# Patient Record
Sex: FEMALE | Race: OTHER
[De-identification: names, ages, dates, MRNs, and addresses within clinical notes are randomized per-mention and may not be internally consistent; named-entity substitution may affect disease eponyms.]

---

## 2018-03-02 ENCOUNTER — HOSPITAL ENCOUNTER (INPATIENT)
Dept: HOSPITAL 8 - LDOP | Age: 28
LOS: 1 days | Discharge: HOME | End: 2018-03-03
Attending: OBSTETRICS & GYNECOLOGY | Admitting: OBSTETRICS & GYNECOLOGY
Payer: COMMERCIAL

## 2018-03-02 VITALS — SYSTOLIC BLOOD PRESSURE: 121 MMHG | DIASTOLIC BLOOD PRESSURE: 72 MMHG

## 2018-03-02 VITALS — DIASTOLIC BLOOD PRESSURE: 80 MMHG | SYSTOLIC BLOOD PRESSURE: 121 MMHG

## 2018-03-02 VITALS — HEIGHT: 60 IN | WEIGHT: 151.24 LBS | BODY MASS INDEX: 29.69 KG/M2

## 2018-03-02 VITALS — SYSTOLIC BLOOD PRESSURE: 130 MMHG | DIASTOLIC BLOOD PRESSURE: 93 MMHG

## 2018-03-02 VITALS — SYSTOLIC BLOOD PRESSURE: 118 MMHG | DIASTOLIC BLOOD PRESSURE: 84 MMHG

## 2018-03-02 DIAGNOSIS — Z3A.40: ICD-10-CM

## 2018-03-02 LAB
BASOPHILS # BLD AUTO: 0.05 X10^3/UL (ref 0–0.1)
BASOPHILS # BLD AUTO: 0.09 X10^3/UL (ref 0–0.1)
BASOPHILS NFR BLD AUTO: 0 % (ref 0–1)
BASOPHILS NFR BLD AUTO: 1 % (ref 0–1)
EOSINOPHIL # BLD AUTO: 0.01 X10^3/UL (ref 0–0.4)
EOSINOPHIL # BLD AUTO: 0.05 X10^3/UL (ref 0–0.4)
EOSINOPHIL NFR BLD AUTO: 0 % (ref 1–7)
EOSINOPHIL NFR BLD AUTO: 0 % (ref 1–7)
ERYTHROCYTE [DISTWIDTH] IN BLOOD BY AUTOMATED COUNT: 13.6 % (ref 9.6–15.2)
ERYTHROCYTE [DISTWIDTH] IN BLOOD BY AUTOMATED COUNT: 13.7 % (ref 9.6–15.2)
LYMPHOCYTES # BLD AUTO: 1.82 X10^3/UL (ref 1–3.4)
LYMPHOCYTES # BLD AUTO: 3.01 X10^3/UL (ref 1–3.4)
LYMPHOCYTES NFR BLD AUTO: 11 % (ref 22–44)
LYMPHOCYTES NFR BLD AUTO: 25 % (ref 22–44)
MCH RBC QN AUTO: 30 PG (ref 27–34.8)
MCH RBC QN AUTO: 30 PG (ref 27–34.8)
MCHC RBC AUTO-ENTMCNC: 33.8 G/DL (ref 32.4–35.8)
MCHC RBC AUTO-ENTMCNC: 34.1 G/DL (ref 32.4–35.8)
MCV RBC AUTO: 87.8 FL (ref 80–100)
MCV RBC AUTO: 88.9 FL (ref 80–100)
MD: NO
MD: NO
MONOCYTES # BLD AUTO: 0.89 X10^3/UL (ref 0.2–0.8)
MONOCYTES # BLD AUTO: 0.97 X10^3/UL (ref 0.2–0.8)
MONOCYTES NFR BLD AUTO: 6 % (ref 2–9)
MONOCYTES NFR BLD AUTO: 8 % (ref 2–9)
NEUTROPHILS # BLD AUTO: 13.41 X10^3/UL (ref 1.8–6.8)
NEUTROPHILS # BLD AUTO: 7.86 X10^3/UL (ref 1.8–6.8)
NEUTROPHILS NFR BLD AUTO: 66 % (ref 42–75)
NEUTROPHILS NFR BLD AUTO: 82 % (ref 42–75)
PLATELET # BLD AUTO: 201 X10^3/UL (ref 130–400)
PLATELET # BLD AUTO: 208 X10^3/UL (ref 130–400)
PMV BLD AUTO: 10 FL (ref 7.4–10.4)
PMV BLD AUTO: 10 FL (ref 7.4–10.4)
RBC # BLD AUTO: 4.42 X10^6/UL (ref 3.82–5.3)
RBC # BLD AUTO: 4.74 X10^6/UL (ref 3.82–5.3)

## 2018-03-02 PROCEDURE — 86900 BLOOD TYPING SEROLOGIC ABO: CPT

## 2018-03-02 PROCEDURE — 36415 COLL VENOUS BLD VENIPUNCTURE: CPT

## 2018-03-02 PROCEDURE — 82803 BLOOD GASES ANY COMBINATION: CPT

## 2018-03-02 PROCEDURE — 86922 COMPATIBILITY TEST ANTIGLOB: CPT

## 2018-03-02 PROCEDURE — 30233S1 TRANSFUSION OF NONAUTOLOGOUS GLOBULIN INTO PERIPHERAL VEIN, PERCUTANEOUS APPROACH: ICD-10-PCS | Performed by: OBSTETRICS & GYNECOLOGY

## 2018-03-02 PROCEDURE — 86923 COMPATIBILITY TEST ELECTRIC: CPT

## 2018-03-02 PROCEDURE — 00HU33Z INSERTION OF INFUSION DEVICE INTO SPINAL CANAL, PERCUTANEOUS APPROACH: ICD-10-PCS | Performed by: OBSTETRICS & GYNECOLOGY

## 2018-03-02 PROCEDURE — 85025 COMPLETE CBC W/AUTO DIFF WBC: CPT

## 2018-03-02 PROCEDURE — 86850 RBC ANTIBODY SCREEN: CPT

## 2018-03-02 PROCEDURE — 3E0R3BZ INTRODUCTION OF ANESTHETIC AGENT INTO SPINAL CANAL, PERCUTANEOUS APPROACH: ICD-10-PCS | Performed by: OBSTETRICS & GYNECOLOGY

## 2018-03-02 PROCEDURE — 85461 HEMOGLOBIN FETAL: CPT

## 2018-03-02 PROCEDURE — 86870 RBC ANTIBODY IDENTIFICATION: CPT

## 2018-03-02 RX ADMIN — SODIUM CHLORIDE, SODIUM LACTATE, POTASSIUM CHLORIDE, AND CALCIUM CHLORIDE SCH MLS/HR: .6; .31; .03; .02 INJECTION, SOLUTION INTRAVENOUS at 09:00

## 2018-03-02 RX ADMIN — PRENATAL VIT W/ FE FUMARATE-FA TAB 27-0.8 MG SCH EACH: 27-0.8 TAB at 09:00

## 2018-03-02 RX ADMIN — Medication SCH MLS/HR: at 18:21

## 2018-03-02 RX ADMIN — IBUPROFEN PRN MG: 600 TABLET ORAL at 20:03

## 2018-03-02 RX ADMIN — SODIUM CHLORIDE, SODIUM LACTATE, POTASSIUM CHLORIDE, AND CALCIUM CHLORIDE SCH MLS/HR: .6; .31; .03; .02 INJECTION, SOLUTION INTRAVENOUS at 17:00

## 2018-03-02 RX ADMIN — IBUPROFEN PRN MG: 600 TABLET ORAL at 12:21

## 2018-03-02 RX ADMIN — Medication SCH MLS/HR: at 08:21

## 2018-03-02 RX ADMIN — DOCUSATE SODIUM PRN MG: 100 CAPSULE, LIQUID FILLED ORAL at 20:03

## 2018-03-03 VITALS — SYSTOLIC BLOOD PRESSURE: 106 MMHG | DIASTOLIC BLOOD PRESSURE: 67 MMHG

## 2018-03-03 VITALS — SYSTOLIC BLOOD PRESSURE: 117 MMHG | DIASTOLIC BLOOD PRESSURE: 84 MMHG

## 2018-03-03 RX ADMIN — Medication SCH MLS/HR: at 05:50

## 2018-03-03 RX ADMIN — SODIUM CHLORIDE, SODIUM LACTATE, POTASSIUM CHLORIDE, AND CALCIUM CHLORIDE SCH MLS/HR: .6; .31; .03; .02 INJECTION, SOLUTION INTRAVENOUS at 01:00

## 2018-03-03 RX ADMIN — IBUPROFEN PRN MG: 600 TABLET ORAL at 02:20

## 2018-03-03 RX ADMIN — DOCUSATE SODIUM PRN MG: 100 CAPSULE, LIQUID FILLED ORAL at 07:54

## 2018-03-03 RX ADMIN — PRENATAL VIT W/ FE FUMARATE-FA TAB 27-0.8 MG SCH EACH: 27-0.8 TAB at 07:54

## 2018-03-03 RX ADMIN — SODIUM CHLORIDE, SODIUM LACTATE, POTASSIUM CHLORIDE, AND CALCIUM CHLORIDE SCH MLS/HR: .6; .31; .03; .02 INJECTION, SOLUTION INTRAVENOUS at 02:16

## 2018-03-03 RX ADMIN — Medication SCH MLS/HR: at 04:21

## 2018-03-03 RX ADMIN — IBUPROFEN PRN MG: 600 TABLET ORAL at 07:54

## 2019-02-28 ENCOUNTER — HOSPITAL ENCOUNTER (OUTPATIENT)
Dept: LAB | Facility: MEDICAL CENTER | Age: 29
End: 2019-02-28
Attending: PHYSICIAN ASSISTANT
Payer: COMMERCIAL

## 2019-02-28 ENCOUNTER — HOSPITAL ENCOUNTER (OUTPATIENT)
Facility: MEDICAL CENTER | Age: 29
End: 2019-02-28
Attending: PHYSICIAN ASSISTANT
Payer: COMMERCIAL

## 2019-02-28 PROCEDURE — 87624 HPV HI-RISK TYP POOLED RSLT: CPT

## 2019-02-28 PROCEDURE — 88175 CYTOPATH C/V AUTO FLUID REDO: CPT

## 2019-03-01 LAB
CYTOLOGY REG CYTOL: NORMAL
HPV HR 12 DNA CVX QL NAA+PROBE: NEGATIVE
HPV16 DNA SPEC QL NAA+PROBE: NEGATIVE
HPV18 DNA SPEC QL NAA+PROBE: NEGATIVE
SPECIMEN SOURCE: NORMAL

## 2019-07-11 ENCOUNTER — OFFICE VISIT (OUTPATIENT)
Dept: URGENT CARE | Facility: PHYSICIAN GROUP | Age: 29
End: 2019-07-11
Payer: COMMERCIAL

## 2019-07-11 VITALS
HEIGHT: 60 IN | WEIGHT: 150 LBS | OXYGEN SATURATION: 94 % | SYSTOLIC BLOOD PRESSURE: 108 MMHG | HEART RATE: 80 BPM | BODY MASS INDEX: 29.45 KG/M2 | DIASTOLIC BLOOD PRESSURE: 72 MMHG | TEMPERATURE: 98 F

## 2019-07-11 DIAGNOSIS — J40 BRONCHITIS: ICD-10-CM

## 2019-07-11 DIAGNOSIS — R05.9 COUGH: ICD-10-CM

## 2019-07-11 PROCEDURE — 99203 OFFICE O/P NEW LOW 30 MIN: CPT | Performed by: PHYSICIAN ASSISTANT

## 2019-07-11 RX ORDER — NORGESTIMATE AND ETHINYL ESTRADIOL 7DAYSX3 28
KIT ORAL
Refills: 4 | COMMUNITY
Start: 2019-05-11

## 2019-07-11 RX ORDER — ALBUTEROL SULFATE 90 UG/1
2 AEROSOL, METERED RESPIRATORY (INHALATION) EVERY 6 HOURS PRN
Qty: 8.5 G | Refills: 0 | Status: SHIPPED | OUTPATIENT
Start: 2019-07-11

## 2019-07-11 RX ORDER — METHYLPREDNISOLONE 4 MG/1
TABLET ORAL
Qty: 1 KIT | Refills: 0 | Status: SHIPPED | OUTPATIENT
Start: 2019-07-11

## 2019-07-11 ASSESSMENT — ENCOUNTER SYMPTOMS
VOMITING: 0
COUGH: 1
WHEEZING: 1
DIZZINESS: 1
EYE REDNESS: 0
DIARRHEA: 0
HEADACHES: 0
TINGLING: 0
SPUTUM PRODUCTION: 0
FEVER: 0
SHORTNESS OF BREATH: 0
CHILLS: 0
EYE DISCHARGE: 0
MYALGIAS: 0

## 2019-07-11 NOTE — PROGRESS NOTES
Subjective:      Alivia Ramirez is a 29 y.o. female who presents with Cough (cough along with mucus x3 weeks pt also states she feels dizzy out of nowhere )            Cough   This is a new problem. Episode onset: 3 weeks ago. The problem has been waxing and waning. The cough is non-productive. Associated symptoms include nasal congestion, postnasal drip and wheezing. Pertinent negatives include no chest pain, chills, ear pain, eye redness, fever, headaches, myalgias, rash or shortness of breath. Exacerbated by: night time, talking.  She has tried nothing for the symptoms. There is no history of asthma.   LNMP- 1 week ago.       Review of Systems   Constitutional: Positive for malaise/fatigue. Negative for chills and fever.   HENT: Positive for congestion and postnasal drip. Negative for ear discharge and ear pain.         Throat hoarseness.   Eyes: Negative for discharge and redness.   Respiratory: Positive for cough and wheezing. Negative for sputum production and shortness of breath.    Cardiovascular: Negative for chest pain.   Gastrointestinal: Negative for diarrhea and vomiting.   Genitourinary: Negative for dysuria.   Musculoskeletal: Negative for myalgias.   Skin: Negative for itching and rash.   Neurological: Positive for dizziness. Negative for tingling and headaches.        Reports intermittent light headedness if she doesn't eat.             Objective:     /72 (BP Location: Left arm, Patient Position: Sitting, BP Cuff Size: Adult)   Pulse 80   Temp 36.7 °C (98 °F) (Temporal)   Ht 1.524 m (5')   Wt 68 kg (150 lb)   SpO2 94%   BMI 29.29 kg/m²    PMH:  has no past medical history on file.  MEDS:   Current Outpatient Prescriptions:   •  TRI-SPRINTEC 0.18/0.215/0.25 MG-35 MCG Tab, , Disp: , Rfl: 4  •  methylPREDNISolone (MEDROL DOSEPAK) 4 MG Tablet Therapy Pack, UAD, Disp: 1 Kit, Rfl: 0  •  albuterol (PROAIR HFA) 108 (90 Base) MCG/ACT Aero Soln inhalation aerosol, Inhale 2 Puffs by mouth every 6  hours as needed for Shortness of Breath., Disp: 8.5 g, Rfl: 0  ALLERGIES: No Known Allergies  SURGHX: No past surgical history on file.  SOCHX:  reports that she has never smoked. She has never used smokeless tobacco.  FH: Family history was reviewed, no pertinent findings to report    Physical Exam   Constitutional: She is oriented to person, place, and time. She appears well-developed and well-nourished.   HENT:   Head: Normocephalic and atraumatic.   Mouth/Throat: No oropharyngeal exudate.   Ears- Canals clear- TM- with clear fluid effusions bilaterally.   Pos. PND, with slight erythema- without tonsillar edema or exudate.   Mild discharge noted bilaterally- to nares.      Eyes: Pupils are equal, round, and reactive to light. EOM are normal.   Neck: Normal range of motion. Neck supple.   Cardiovascular: Normal rate and regular rhythm.    No murmur heard.  Pulmonary/Chest: Effort normal. No respiratory distress. She has wheezes.   Harsh bronchial cough throughout the duration of the visit today.    Musculoskeletal: Normal range of motion. She exhibits no tenderness.   Lymphadenopathy:     She has no cervical adenopathy.   Neurological: She is alert and oriented to person, place, and time.   Skin: Skin is warm. No rash noted.   Psychiatric: She has a normal mood and affect. Her behavior is normal.   Vitals reviewed.              Assessment/Plan:     1. Bronchitis  - methylPREDNISolone (MEDROL DOSEPAK) 4 MG Tablet Therapy Pack; UAD  Dispense: 1 Kit; Refill: 0  - albuterol (PROAIR HFA) 108 (90 Base) MCG/ACT Aero Soln inhalation aerosol; Inhale 2 Puffs by mouth every 6 hours as needed for Shortness of Breath.  Dispense: 8.5 g; Refill: 0    2. Cough    It was explained to the pt. Today that due to the viral nature of the pt's illness, we will treat symptomatically today. Encouraged OTC supportive meds PRN. Encouraged small snacks throughout the day.   Humidification, increase fluids, avoid night time dairy.    Patient given precautionary s/sx that mandate immediate follow up and evaluation in the ED. Advised of risks of not doing so.    DDX, Supportive care, and indications for immediate follow-up discussed with patient.    Instructed to return to clinic or nearest emergency department if we are not available for any change in condition, further concerns, or worsening of symptoms.    The patient demonstrated a good understanding and agreed with the treatment plan.

## 2020-02-11 ENCOUNTER — HOSPITAL ENCOUNTER (OUTPATIENT)
Dept: HOSPITAL 8 - LDOP | Age: 30
Discharge: HOME | End: 2020-02-11
Attending: OBSTETRICS & GYNECOLOGY
Payer: COMMERCIAL

## 2020-02-11 DIAGNOSIS — Z3A.26: ICD-10-CM

## 2020-02-11 DIAGNOSIS — Z34.92: Primary | ICD-10-CM

## 2020-02-11 LAB — MICROSCOPIC: (no result)

## 2020-02-11 PROCEDURE — 76815 OB US LIMITED FETUS(S): CPT

## 2020-02-11 PROCEDURE — 87086 URINE CULTURE/COLONY COUNT: CPT

## 2020-02-11 PROCEDURE — 81001 URINALYSIS AUTO W/SCOPE: CPT

## 2020-02-11 PROCEDURE — G0463 HOSPITAL OUTPT CLINIC VISIT: HCPCS

## 2020-02-11 PROCEDURE — 85460 HEMOGLOBIN FETAL: CPT

## 2020-02-11 PROCEDURE — 99211 OFF/OP EST MAY X REQ PHY/QHP: CPT

## 2020-02-11 PROCEDURE — G0378 HOSPITAL OBSERVATION PER HR: HCPCS

## 2020-02-11 PROCEDURE — 36415 COLL VENOUS BLD VENIPUNCTURE: CPT

## 2020-04-25 ENCOUNTER — HOSPITAL ENCOUNTER (INPATIENT)
Dept: HOSPITAL 8 - LDOP | Age: 30
LOS: 2 days | Discharge: HOME | End: 2020-04-27
Attending: OBSTETRICS & GYNECOLOGY | Admitting: OBSTETRICS & GYNECOLOGY
Payer: COMMERCIAL

## 2020-04-25 VITALS — WEIGHT: 154.32 LBS | BODY MASS INDEX: 25.71 KG/M2 | HEIGHT: 65 IN

## 2020-04-25 DIAGNOSIS — Z80.3: ICD-10-CM

## 2020-04-25 DIAGNOSIS — Z3A.36: ICD-10-CM

## 2020-04-25 LAB
ALBUMIN SERPL-MCNC: 2.6 G/DL (ref 3.4–5)
ALP SERPL-CCNC: 223 U/L (ref 45–117)
ALT SERPL-CCNC: 126 U/L (ref 12–78)
ANION GAP SERPL CALC-SCNC: 10 MMOL/L (ref 5–15)
BASOPHILS # BLD AUTO: 0.01 X10^3/UL (ref 0–0.1)
BASOPHILS # BLD AUTO: 0.04 X10^3/UL (ref 0–0.1)
BASOPHILS NFR BLD AUTO: 0 % (ref 0–1)
BASOPHILS NFR BLD AUTO: 0 % (ref 0–1)
BILIRUB SERPL-MCNC: 0.5 MG/DL (ref 0.2–1)
CALCIUM SERPL-MCNC: 8.8 MG/DL (ref 8.5–10.1)
CHLORIDE SERPL-SCNC: 111 MMOL/L (ref 98–107)
CREAT SERPL-MCNC: 0.95 MG/DL (ref 0.55–1.02)
EOSINOPHIL # BLD AUTO: 0.02 X10^3/UL (ref 0–0.4)
EOSINOPHIL # BLD AUTO: 0.06 X10^3/UL (ref 0–0.4)
EOSINOPHIL NFR BLD AUTO: 0 % (ref 1–7)
EOSINOPHIL NFR BLD AUTO: 1 % (ref 1–7)
ERYTHROCYTE [DISTWIDTH] IN BLOOD BY AUTOMATED COUNT: 13.9 % (ref 9.6–15.2)
ERYTHROCYTE [DISTWIDTH] IN BLOOD BY AUTOMATED COUNT: 14.1 % (ref 9.6–15.2)
HBV CORE IGM SERPL QL IA: 21 MG/DL (ref 0–12)
HBV SURFACE AB SER RIA-ACNC: 82.2 MG/DL
LYMPHOCYTES # BLD AUTO: 1.54 X10^3/UL (ref 1–3.4)
LYMPHOCYTES # BLD AUTO: 2.15 X10^3/UL (ref 1–3.4)
LYMPHOCYTES NFR BLD AUTO: 11 % (ref 22–44)
LYMPHOCYTES NFR BLD AUTO: 16 % (ref 22–44)
MCH RBC QN AUTO: 30 PG (ref 27–34.8)
MCH RBC QN AUTO: 30.1 PG (ref 27–34.8)
MCHC RBC AUTO-ENTMCNC: 34 G/DL (ref 32.4–35.8)
MCHC RBC AUTO-ENTMCNC: 34.5 G/DL (ref 32.4–35.8)
MCV RBC AUTO: 87.4 FL (ref 80–100)
MCV RBC AUTO: 88.4 FL (ref 80–100)
MD: (no result)
MD: (no result)
MICROSCOPIC: (no result)
MONOCYTES # BLD AUTO: 0.75 X10^3/UL (ref 0.2–0.8)
MONOCYTES # BLD AUTO: 0.8 X10^3/UL (ref 0.2–0.8)
MONOCYTES NFR BLD AUTO: 6 % (ref 2–9)
MONOCYTES NFR BLD AUTO: 6 % (ref 2–9)
NEUTROPHILS # BLD AUTO: 10.01 X10^3/UL (ref 1.8–6.8)
NEUTROPHILS # BLD AUTO: 11.26 X10^3/UL (ref 1.8–6.8)
NEUTROPHILS NFR BLD AUTO: 77 % (ref 42–75)
NEUTROPHILS NFR BLD AUTO: 83 % (ref 42–75)
PLATELET # BLD AUTO: 45 X10^3/UL (ref 130–400)
PLATELET # BLD AUTO: 61 X10^3/UL (ref 130–400)
PMV BLD AUTO: 10.1 FL (ref 7.4–10.4)
PMV BLD AUTO: 9.2 FL (ref 7.4–10.4)
PROT SERPL-MCNC: 6.8 G/DL (ref 6.4–8.2)
RBC # BLD AUTO: 4.56 X10^6/UL (ref 3.82–5.3)
RBC # BLD AUTO: 4.77 X10^6/UL (ref 3.82–5.3)

## 2020-04-25 PROCEDURE — 85461 HEMOGLOBIN FETAL: CPT

## 2020-04-25 PROCEDURE — 83735 ASSAY OF MAGNESIUM: CPT

## 2020-04-25 PROCEDURE — 82570 ASSAY OF URINE CREATININE: CPT

## 2020-04-25 PROCEDURE — 86923 COMPATIBILITY TEST ELECTRIC: CPT

## 2020-04-25 PROCEDURE — 86850 RBC ANTIBODY SCREEN: CPT

## 2020-04-25 PROCEDURE — 87086 URINE CULTURE/COLONY COUNT: CPT

## 2020-04-25 PROCEDURE — 10907ZC DRAINAGE OF AMNIOTIC FLUID, THERAPEUTIC FROM PRODUCTS OF CONCEPTION, VIA NATURAL OR ARTIFICIAL OPENING: ICD-10-PCS | Performed by: OBSTETRICS & GYNECOLOGY

## 2020-04-25 PROCEDURE — 86900 BLOOD TYPING SEROLOGIC ABO: CPT

## 2020-04-25 PROCEDURE — 82248 BILIRUBIN DIRECT: CPT

## 2020-04-25 PROCEDURE — 36415 COLL VENOUS BLD VENIPUNCTURE: CPT

## 2020-04-25 PROCEDURE — 84156 ASSAY OF PROTEIN URINE: CPT

## 2020-04-25 PROCEDURE — 86592 SYPHILIS TEST NON-TREP QUAL: CPT

## 2020-04-25 PROCEDURE — 80053 COMPREHEN METABOLIC PANEL: CPT

## 2020-04-25 PROCEDURE — 84550 ASSAY OF BLOOD/URIC ACID: CPT

## 2020-04-25 PROCEDURE — 85025 COMPLETE CBC W/AUTO DIFF WBC: CPT

## 2020-04-25 PROCEDURE — 82803 BLOOD GASES ANY COMBINATION: CPT

## 2020-04-25 PROCEDURE — 81001 URINALYSIS AUTO W/SCOPE: CPT

## 2020-04-25 RX ADMIN — REMIFENTANIL HYDROCHLORIDE PRN MG: 1 INJECTION, POWDER, LYOPHILIZED, FOR SOLUTION INTRAVENOUS at 21:19

## 2020-04-25 RX ADMIN — REMIFENTANIL HYDROCHLORIDE PRN MG: 1 INJECTION, POWDER, LYOPHILIZED, FOR SOLUTION INTRAVENOUS at 07:30

## 2020-04-25 RX ADMIN — ACETAMINOPHEN PRN MG: 325 TABLET, FILM COATED ORAL at 11:10

## 2020-04-25 RX ADMIN — CARBOPROST TROMETHAMINE PRN MCG: 250 INJECTION, SOLUTION INTRAMUSCULAR at 21:09

## 2020-04-25 RX ADMIN — REMIFENTANIL HYDROCHLORIDE PRN MG: 1 INJECTION, POWDER, LYOPHILIZED, FOR SOLUTION INTRAVENOUS at 21:16

## 2020-04-25 RX ADMIN — ACETAMINOPHEN PRN MG: 325 TABLET, FILM COATED ORAL at 04:49

## 2020-04-25 RX ADMIN — CARBOPROST TROMETHAMINE PRN MCG: 250 INJECTION, SOLUTION INTRAMUSCULAR at 21:10

## 2020-04-25 RX ADMIN — MAGNESIUM SULFATE HEPTAHYDRATE SCH MLS/HR: 40 INJECTION, SOLUTION INTRAVENOUS at 03:19

## 2020-04-25 RX ADMIN — MAGNESIUM SULFATE HEPTAHYDRATE SCH MLS/HR: 40 INJECTION, SOLUTION INTRAVENOUS at 16:19

## 2020-04-26 VITALS — DIASTOLIC BLOOD PRESSURE: 90 MMHG | SYSTOLIC BLOOD PRESSURE: 144 MMHG

## 2020-04-26 VITALS — SYSTOLIC BLOOD PRESSURE: 139 MMHG | DIASTOLIC BLOOD PRESSURE: 88 MMHG

## 2020-04-26 VITALS — DIASTOLIC BLOOD PRESSURE: 101 MMHG | SYSTOLIC BLOOD PRESSURE: 149 MMHG

## 2020-04-26 VITALS — DIASTOLIC BLOOD PRESSURE: 99 MMHG | SYSTOLIC BLOOD PRESSURE: 166 MMHG

## 2020-04-26 VITALS — DIASTOLIC BLOOD PRESSURE: 85 MMHG | SYSTOLIC BLOOD PRESSURE: 136 MMHG

## 2020-04-26 VITALS — SYSTOLIC BLOOD PRESSURE: 140 MMHG | DIASTOLIC BLOOD PRESSURE: 91 MMHG

## 2020-04-26 VITALS — SYSTOLIC BLOOD PRESSURE: 134 MMHG | DIASTOLIC BLOOD PRESSURE: 89 MMHG

## 2020-04-26 VITALS — SYSTOLIC BLOOD PRESSURE: 134 MMHG | DIASTOLIC BLOOD PRESSURE: 88 MMHG

## 2020-04-26 VITALS — SYSTOLIC BLOOD PRESSURE: 143 MMHG | DIASTOLIC BLOOD PRESSURE: 92 MMHG

## 2020-04-26 VITALS — DIASTOLIC BLOOD PRESSURE: 100 MMHG | SYSTOLIC BLOOD PRESSURE: 139 MMHG

## 2020-04-26 LAB
ALBUMIN SERPL-MCNC: 2 G/DL (ref 3.4–5)
ALP SERPL-CCNC: 167 U/L (ref 45–117)
ALT SERPL-CCNC: 177 U/L (ref 12–78)
ANION GAP SERPL CALC-SCNC: 8 MMOL/L (ref 5–15)
BASOPHILS # BLD AUTO: 0.03 X10^3/UL (ref 0–0.1)
BASOPHILS NFR BLD AUTO: 0 % (ref 0–1)
BILIRUB SERPL-MCNC: 0.6 MG/DL (ref 0.2–1)
CALCIUM SERPL-MCNC: 6.9 MG/DL (ref 8.5–10.1)
CHLORIDE SERPL-SCNC: 108 MMOL/L (ref 98–107)
CREAT SERPL-MCNC: 0.72 MG/DL (ref 0.55–1.02)
EOSINOPHIL # BLD AUTO: 0.09 X10^3/UL (ref 0–0.4)
EOSINOPHIL NFR BLD AUTO: 1 % (ref 1–7)
ERYTHROCYTE [DISTWIDTH] IN BLOOD BY AUTOMATED COUNT: 14.1 % (ref 9.6–15.2)
LYMPHOCYTES # BLD AUTO: 2.04 X10^3/UL (ref 1–3.4)
LYMPHOCYTES NFR BLD AUTO: 15 % (ref 22–44)
MCH RBC QN AUTO: 29.9 PG (ref 27–34.8)
MCHC RBC AUTO-ENTMCNC: 33.6 G/DL (ref 32.4–35.8)
MCV RBC AUTO: 89.1 FL (ref 80–100)
MD: (no result)
MONOCYTES # BLD AUTO: 1.02 X10^3/UL (ref 0.2–0.8)
MONOCYTES NFR BLD AUTO: 8 % (ref 2–9)
NEUTROPHILS # BLD AUTO: 10.07 X10^3/UL (ref 1.8–6.8)
NEUTROPHILS NFR BLD AUTO: 76 % (ref 42–75)
PLATELET # BLD AUTO: 46 X10^3/UL (ref 130–400)
PMV BLD AUTO: 8.6 FL (ref 7.4–10.4)
PROT SERPL-MCNC: 5.7 G/DL (ref 6.4–8.2)
RBC # BLD AUTO: 3.85 X10^6/UL (ref 3.82–5.3)

## 2020-04-26 PROCEDURE — 3E0234Z INTRODUCTION OF SERUM, TOXOID AND VACCINE INTO MUSCLE, PERCUTANEOUS APPROACH: ICD-10-PCS | Performed by: OBSTETRICS & GYNECOLOGY

## 2020-04-26 RX ADMIN — PRENATAL VIT W/ FE FUMARATE-FA TAB 27-0.8 MG SCH EACH: 27-0.8 TAB at 08:09

## 2020-04-26 RX ADMIN — IBUPROFEN PRN MG: 600 TABLET ORAL at 15:20

## 2020-04-26 RX ADMIN — CARBOPROST TROMETHAMINE PRN MCG: 250 INJECTION, SOLUTION INTRAMUSCULAR at 02:17

## 2020-04-26 RX ADMIN — SODIUM CHLORIDE, PRESERVATIVE FREE SCH ML: 5 INJECTION INTRAVENOUS at 21:00

## 2020-04-26 RX ADMIN — IBUPROFEN PRN MG: 600 TABLET ORAL at 01:50

## 2020-04-26 RX ADMIN — IBUPROFEN PRN MG: 600 TABLET ORAL at 08:09

## 2020-04-27 VITALS — DIASTOLIC BLOOD PRESSURE: 87 MMHG | SYSTOLIC BLOOD PRESSURE: 123 MMHG

## 2020-04-27 VITALS — SYSTOLIC BLOOD PRESSURE: 136 MMHG | DIASTOLIC BLOOD PRESSURE: 89 MMHG

## 2020-04-27 VITALS — DIASTOLIC BLOOD PRESSURE: 86 MMHG | SYSTOLIC BLOOD PRESSURE: 126 MMHG

## 2020-04-27 LAB
ALBUMIN SERPL-MCNC: 2 G/DL (ref 3.4–5)
ALP SERPL-CCNC: 160 U/L (ref 45–117)
ALT SERPL-CCNC: 106 U/L (ref 12–78)
ANION GAP SERPL CALC-SCNC: 7 MMOL/L (ref 5–15)
BASOPHILS # BLD AUTO: 0.04 X10^3/UL (ref 0–0.1)
BASOPHILS NFR BLD AUTO: 0 % (ref 0–1)
BILIRUB SERPL-MCNC: 0.2 MG/DL (ref 0.2–1)
CALCIUM SERPL-MCNC: 8.1 MG/DL (ref 8.5–10.1)
CHLORIDE SERPL-SCNC: 110 MMOL/L (ref 98–107)
CREAT SERPL-MCNC: 0.75 MG/DL (ref 0.55–1.02)
EOSINOPHIL # BLD AUTO: 0.25 X10^3/UL (ref 0–0.4)
EOSINOPHIL NFR BLD AUTO: 2 % (ref 1–7)
ERYTHROCYTE [DISTWIDTH] IN BLOOD BY AUTOMATED COUNT: 14.3 % (ref 9.6–15.2)
LYMPHOCYTES # BLD AUTO: 2.98 X10^3/UL (ref 1–3.4)
LYMPHOCYTES NFR BLD AUTO: 25 % (ref 22–44)
MCH RBC QN AUTO: 30.7 PG (ref 27–34.8)
MCHC RBC AUTO-ENTMCNC: 34.1 G/DL (ref 32.4–35.8)
MCV RBC AUTO: 90 FL (ref 80–100)
MD: (no result)
MONOCYTES # BLD AUTO: 0.75 X10^3/UL (ref 0.2–0.8)
MONOCYTES NFR BLD AUTO: 6 % (ref 2–9)
NEUTROPHILS # BLD AUTO: 7.93 X10^3/UL (ref 1.8–6.8)
NEUTROPHILS NFR BLD AUTO: 66 % (ref 42–75)
PLATELET # BLD AUTO: 65 X10^3/UL (ref 130–400)
PMV BLD AUTO: 9.8 FL (ref 7.4–10.4)
PROT SERPL-MCNC: 5.6 G/DL (ref 6.4–8.2)
RBC # BLD AUTO: 3.51 X10^6/UL (ref 3.82–5.3)

## 2020-04-27 RX ADMIN — PRENATAL VIT W/ FE FUMARATE-FA TAB 27-0.8 MG SCH EACH: 27-0.8 TAB at 07:38

## 2020-04-27 RX ADMIN — IBUPROFEN PRN MG: 600 TABLET ORAL at 07:38

## 2020-04-27 RX ADMIN — IBUPROFEN PRN MG: 600 TABLET ORAL at 14:29

## 2020-04-27 RX ADMIN — SODIUM CHLORIDE, PRESERVATIVE FREE SCH ML: 5 INJECTION INTRAVENOUS at 10:15

## 2020-06-01 ENCOUNTER — HOSPITAL ENCOUNTER (OUTPATIENT)
Dept: LAB | Facility: MEDICAL CENTER | Age: 30
End: 2020-06-01
Attending: OBSTETRICS & GYNECOLOGY
Payer: COMMERCIAL

## 2020-06-01 PROCEDURE — 88175 CYTOPATH C/V AUTO FLUID REDO: CPT

## 2020-06-02 LAB — CYTOLOGY REG CYTOL: NORMAL

## 2021-08-20 ENCOUNTER — HOSPITAL ENCOUNTER (OUTPATIENT)
Dept: LAB | Facility: MEDICAL CENTER | Age: 31
End: 2021-08-20
Attending: PHYSICIAN ASSISTANT
Payer: COMMERCIAL

## 2021-08-20 LAB — CYTOLOGY REG CYTOL: NORMAL

## 2021-08-20 PROCEDURE — 88175 CYTOPATH C/V AUTO FLUID REDO: CPT

## 2022-11-09 ENCOUNTER — HOSPITAL ENCOUNTER (OUTPATIENT)
Facility: MEDICAL CENTER | Age: 32
End: 2022-11-09
Attending: PHYSICIAN ASSISTANT
Payer: COMMERCIAL

## 2022-11-09 ENCOUNTER — HOSPITAL ENCOUNTER (OUTPATIENT)
Dept: LAB | Facility: MEDICAL CENTER | Age: 32
End: 2022-11-09
Attending: PHYSICIAN ASSISTANT

## 2022-11-09 PROCEDURE — 88175 CYTOPATH C/V AUTO FLUID REDO: CPT

## 2022-11-09 PROCEDURE — 87624 HPV HI-RISK TYP POOLED RSLT: CPT

## 2025-06-27 ENCOUNTER — HOSPITAL ENCOUNTER (OUTPATIENT)
Facility: MEDICAL CENTER | Age: 35
End: 2025-06-27
Attending: PHYSICIAN ASSISTANT
Payer: COMMERCIAL

## 2025-06-27 PROCEDURE — 88142 CYTOPATH C/V THIN LAYER: CPT

## 2025-07-07 LAB
HPV I/H RISK 1 DNA SPEC QL NAA+PROBE: NOT DETECTED
SPECIMEN SOURCE: NORMAL
THINPREP PAP, CYTOLOGY NL11781: NORMAL